# Patient Record
Sex: FEMALE | Race: WHITE
[De-identification: names, ages, dates, MRNs, and addresses within clinical notes are randomized per-mention and may not be internally consistent; named-entity substitution may affect disease eponyms.]

---

## 2018-10-13 ENCOUNTER — HOSPITAL ENCOUNTER (OUTPATIENT)
Dept: HOSPITAL 53 - M WUC | Age: 43
End: 2018-10-13
Attending: NURSE PRACTITIONER
Payer: COMMERCIAL

## 2018-10-13 DIAGNOSIS — E89.0: ICD-10-CM

## 2018-10-13 DIAGNOSIS — E78.49: ICD-10-CM

## 2018-10-13 DIAGNOSIS — E55.9: Primary | ICD-10-CM

## 2018-10-13 DIAGNOSIS — G43.909: ICD-10-CM

## 2018-10-13 LAB
ALBUMIN/GLOBULIN RATIO: 1.18 (ref 1–1.93)
ALBUMIN: 3.9 GM/DL (ref 3.2–5.2)
ALKALINE PHOSPHATASE: 69 U/L (ref 45–117)
ALT SERPL W P-5'-P-CCNC: 21 U/L (ref 12–78)
ANION GAP: 8 MEQ/L (ref 8–16)
AST SERPL-CCNC: 10 U/L (ref 7–37)
BASO #: 0.1 10^3/UL (ref 0–0.2)
BASO %: 0.5 % (ref 0–1)
BILIRUBIN,TOTAL: 0.3 MG/DL (ref 0.2–1)
BLOOD UREA NITROGEN: 11 MG/DL (ref 7–18)
CALCIUM LEVEL: 8.6 MG/DL (ref 8.5–10.1)
CARBON DIOXIDE LEVEL: 30 MEQ/L (ref 21–32)
CHLORIDE LEVEL: 106 MEQ/L (ref 98–107)
CHOLEST SERPL-MCNC: 211 MG/DL (ref ?–200)
CHOLESTEROL RISK RATIO: 4.31 (ref ?–5)
CREATININE FOR GFR: 0.93 MG/DL (ref 0.55–1.3)
EOS #: 0.2 10^3/UL (ref 0–0.5)
EOSINOPHIL NFR BLD AUTO: 2.3 % (ref 0–3)
FREE T3: 2.8 PG/ML (ref 2.2–4)
FREE T4: 1.21 NG/DL (ref 0.76–1.46)
GFR SERPL CREATININE-BSD FRML MDRD: > 60 ML/MIN/{1.73_M2} (ref 58–?)
GLUCOSE, FASTING: 91 MG/DL (ref 70–100)
HDLC SERPL-MCNC: 49 MG/DL (ref 40–?)
HEMATOCRIT: 44.9 % (ref 36–47)
HEMOGLOBIN: 14.8 G/DL (ref 12–15.5)
IMMATURE GRANULOCYTE %: 0.3 % (ref 0–3)
LDL CHOLESTEROL: 129 MG/DL (ref ?–100)
LYMPH #: 2.1 10^3/UL (ref 1.5–4.5)
LYMPH %: 23.2 % (ref 24–44)
MEAN CORPUSCULAR HEMOGLOBIN: 28.8 PG (ref 27–33)
MEAN CORPUSCULAR HGB CONC: 33 G/DL (ref 32–36.5)
MEAN CORPUSCULAR VOLUME: 87.4 FL (ref 80–96)
MONO #: 0.6 10^3/UL (ref 0–0.8)
MONO %: 6.8 % (ref 0–5)
NEUTROPHILS #: 6.1 10^3/UL (ref 1.8–7.7)
NEUTROPHILS %: 66.9 % (ref 36–66)
NONHDLC SERPL-MCNC: 162 MG/DL
NRBC BLD AUTO-RTO: 0 % (ref 0–0)
PLATELET COUNT, AUTOMATED: 332 10^3/UL (ref 150–450)
POTASSIUM SERUM: 3.7 MEQ/L (ref 3.5–5.1)
RED BLOOD COUNT: 5.14 10^6/UL (ref 4–5.4)
RED CELL DISTRIBUTION WIDTH: 12.2 % (ref 11.5–14.5)
SODIUM LEVEL: 144 MEQ/L (ref 136–145)
THYROID STIMULATING HORMONE: 0.24 UIU/ML (ref 0.36–3.74)
TOTAL PROTEIN: 7.2 GM/DL (ref 6.4–8.2)
TRIGLYCERIDES LEVEL: 164 MG/DL (ref ?–150)
WHITE BLOOD COUNT: 9.2 10^3/UL (ref 4–10)

## 2018-10-13 PROCEDURE — 84443 ASSAY THYROID STIM HORMONE: CPT

## 2018-10-15 LAB — 25(OH)D3 SERPL-MCNC: 25.9 NG/ML (ref 30–100)

## 2018-11-03 ENCOUNTER — HOSPITAL ENCOUNTER (OUTPATIENT)
Dept: HOSPITAL 53 - M WUC | Age: 43
End: 2018-11-03
Attending: NURSE PRACTITIONER
Payer: COMMERCIAL

## 2018-11-03 DIAGNOSIS — E89.0: Primary | ICD-10-CM

## 2018-11-03 LAB
FREE T3: 2.8 PG/ML (ref 2.2–4)
FREE T4: 1.33 NG/DL (ref 0.76–1.46)
THYROID STIMULATING HORMONE: 0.62 UIU/ML (ref 0.36–3.74)

## 2018-11-03 PROCEDURE — 84443 ASSAY THYROID STIM HORMONE: CPT

## 2018-12-14 ENCOUNTER — HOSPITAL ENCOUNTER (OUTPATIENT)
Dept: HOSPITAL 53 - M SFHCWAGY | Age: 43
End: 2018-12-14
Attending: NURSE PRACTITIONER
Payer: COMMERCIAL

## 2018-12-14 DIAGNOSIS — Z12.4: Primary | ICD-10-CM

## 2018-12-17 LAB — HPV LOW VOL RFLX: (no result)

## 2018-12-20 ENCOUNTER — HOSPITAL ENCOUNTER (OUTPATIENT)
Dept: HOSPITAL 53 - M WHC | Age: 43
End: 2018-12-20
Attending: NURSE PRACTITIONER
Payer: COMMERCIAL

## 2018-12-20 DIAGNOSIS — N92.1: ICD-10-CM

## 2018-12-20 DIAGNOSIS — N92.6: Primary | ICD-10-CM

## 2018-12-21 NOTE — REP
Clinical:  Pelvic pain and irregular menstrual cycles .

 

Technique:  Transabdominal pelvic ultrasound followed by transvaginal examination

for better evaluation of the endometrium and adnexa.

 

Findings:

 

Bladder is unremarkable and measures 10.7 x 8.2 x 9.8 cm .

 

Anteverted  uterus measures 8.7 x 3.7 x 4.7 cm and a vague heterogeneous 15 x 18

x 23 mm area in the lower uterine segment is nonspecific but fibroid cannot be

excluded.  The endometrial complex measures 3.7 mm thickness.  Trace endocervical

fluid is identified and nonspecific.  A 6 mm endometrial cyst is identified in

the lower uterine segment along with small Nabothian cysts.

 

Bilateral ovaries are essentially normal in appearance.  Right ovary measures 2.3

x 1.3 x 1.7 cm with 9 mm follicle.  Left ovary measures 2.7 x 1.3 x 1.9 cm.

 

No pelvic free fluid or adnexal mass lesion .

 

Impression:

1.  Cannot exclude poorly defined fibroid in the lower uterine segment.

 

 

Electronically Signed by

Ambrose Maciel MD 12/21/2018 08:17 A

## 2019-01-14 ENCOUNTER — HOSPITAL ENCOUNTER (EMERGENCY)
Dept: HOSPITAL 53 - M ED | Age: 44
Discharge: HOME | End: 2019-01-14
Payer: COMMERCIAL

## 2019-01-14 VITALS — BODY MASS INDEX: 28.99 KG/M2 | HEIGHT: 66 IN | WEIGHT: 180.38 LBS

## 2019-01-14 VITALS — DIASTOLIC BLOOD PRESSURE: 87 MMHG | SYSTOLIC BLOOD PRESSURE: 140 MMHG

## 2019-01-14 DIAGNOSIS — E86.0: ICD-10-CM

## 2019-01-14 DIAGNOSIS — R11.0: Primary | ICD-10-CM

## 2019-01-14 DIAGNOSIS — E87.6: ICD-10-CM

## 2019-01-14 DIAGNOSIS — R51: ICD-10-CM

## 2019-01-14 LAB
ALBUMIN SERPL BCG-MCNC: 3.8 GM/DL (ref 3.2–5.2)
ALT SERPL W P-5'-P-CCNC: 19 U/L (ref 12–78)
AMYLASE SERPL-CCNC: 51 U/L (ref 25–115)
B-HCG SERPL QL: NEGATIVE
BASOPHILS # BLD AUTO: 0 10^3/UL (ref 0–0.2)
BASOPHILS NFR BLD AUTO: 0.3 % (ref 0–1)
BILIRUB CONJ SERPL-MCNC: 0.1 MG/DL (ref 0–0.2)
BILIRUB SERPL-MCNC: 0.5 MG/DL (ref 0.2–1)
BUN SERPL-MCNC: 11 MG/DL (ref 7–18)
CALCIUM SERPL-MCNC: 8.9 MG/DL (ref 8.5–10.1)
CHLORIDE SERPL-SCNC: 104 MEQ/L (ref 98–107)
CO2 SERPL-SCNC: 26 MEQ/L (ref 21–32)
CREAT SERPL-MCNC: 0.8 MG/DL (ref 0.55–1.3)
EOSINOPHIL # BLD AUTO: 0.1 10^3/UL (ref 0–0.5)
EOSINOPHIL NFR BLD AUTO: 1 % (ref 0–3)
GFR SERPL CREATININE-BSD FRML MDRD: > 60 ML/MIN/{1.73_M2} (ref 58–?)
GLUCOSE SERPL-MCNC: 93 MG/DL (ref 70–100)
HCT VFR BLD AUTO: 46.2 % (ref 36–47)
HGB BLD-MCNC: 15.5 G/DL (ref 12–15.5)
LIPASE SERPL-CCNC: 98 U/L (ref 73–393)
LYMPHOCYTES # BLD AUTO: 2.6 10^3/UL (ref 1.5–4.5)
LYMPHOCYTES NFR BLD AUTO: 25.1 % (ref 24–44)
MCH RBC QN AUTO: 29.1 PG (ref 27–33)
MCHC RBC AUTO-ENTMCNC: 33.5 G/DL (ref 32–36.5)
MCV RBC AUTO: 86.7 FL (ref 80–96)
MONOCYTES # BLD AUTO: 0.7 10^3/UL (ref 0–0.8)
MONOCYTES NFR BLD AUTO: 6.5 % (ref 0–5)
NEUTROPHILS # BLD AUTO: 7 10^3/UL (ref 1.8–7.7)
NEUTROPHILS NFR BLD AUTO: 66.8 % (ref 36–66)
PLATELET # BLD AUTO: 357 10^3/UL (ref 150–450)
POTASSIUM SERPL-SCNC: 3.3 MEQ/L (ref 3.5–5.1)
PROT SERPL-MCNC: 7.4 GM/DL (ref 6.4–8.2)
RBC # BLD AUTO: 5.33 10^6/UL (ref 4–5.4)
SODIUM SERPL-SCNC: 142 MEQ/L (ref 136–145)
WBC # BLD AUTO: 10.5 10^3/UL (ref 4–10)

## 2019-01-14 PROCEDURE — 81001 URINALYSIS AUTO W/SCOPE: CPT

## 2019-01-14 PROCEDURE — 85025 COMPLETE CBC W/AUTO DIFF WBC: CPT

## 2019-01-14 PROCEDURE — 80048 BASIC METABOLIC PNL TOTAL CA: CPT

## 2019-01-14 PROCEDURE — 36415 COLL VENOUS BLD VENIPUNCTURE: CPT

## 2019-01-14 PROCEDURE — 80076 HEPATIC FUNCTION PANEL: CPT

## 2019-01-14 PROCEDURE — 83690 ASSAY OF LIPASE: CPT

## 2019-01-14 PROCEDURE — 84703 CHORIONIC GONADOTROPIN ASSAY: CPT

## 2019-01-14 PROCEDURE — 96374 THER/PROPH/DIAG INJ IV PUSH: CPT

## 2019-01-14 PROCEDURE — 99284 EMERGENCY DEPT VISIT MOD MDM: CPT

## 2019-01-14 PROCEDURE — 87086 URINE CULTURE/COLONY COUNT: CPT

## 2019-01-14 PROCEDURE — 93005 ELECTROCARDIOGRAM TRACING: CPT

## 2019-01-14 PROCEDURE — 82150 ASSAY OF AMYLASE: CPT

## 2019-01-15 NOTE — ECGEPIP
Stationary ECG Study

                           [facility] - ED

                                       

                                       Test Date:    2019

Pat Name:     DIANE DUENAS             Department:   

Patient ID:   [id number]                 Room:         -

Gender:       F                        Technician:   marysol

:          1975               Requested By: Shawnee HALL PA-C

Order Number: [id number]     Reading MD:   Maja Lundborg-Gray

                                 Measurements

Intervals                              Axis          

Rate:         67                       P:            43

AR:           152                      QRS:          52

QRSD:         95                       T:            80

QT:           393                                    

QTc:          417                                    

                           Interpretive Statements

SINUS RHYTHM

INCOMPLETE RIGHT BUNDLE BRANCH BLOCK

NONSPECIFIC ST & T-WAVE ABNORMALITY

DECREASED QRS VOLTAGE LIMB LEADS

DELAYED R WAVE PROGRESSION

 

CW 17 RATE DECREASED

NONSPECIFIC ST T WAVE CHANGES

 

Electronically Signed On 1- 19:28:40 EST by Maja Lundborg-Gray

## 2019-02-14 ENCOUNTER — HOSPITAL ENCOUNTER (OUTPATIENT)
Dept: HOSPITAL 53 - M SDC | Age: 44
LOS: 1 days | Discharge: HOME | End: 2019-02-15
Attending: UROLOGY
Payer: COMMERCIAL

## 2019-02-14 VITALS — DIASTOLIC BLOOD PRESSURE: 83 MMHG | SYSTOLIC BLOOD PRESSURE: 143 MMHG

## 2019-02-14 VITALS — DIASTOLIC BLOOD PRESSURE: 82 MMHG | SYSTOLIC BLOOD PRESSURE: 162 MMHG

## 2019-02-14 VITALS — DIASTOLIC BLOOD PRESSURE: 85 MMHG | SYSTOLIC BLOOD PRESSURE: 142 MMHG

## 2019-02-14 VITALS — SYSTOLIC BLOOD PRESSURE: 147 MMHG | DIASTOLIC BLOOD PRESSURE: 95 MMHG

## 2019-02-14 VITALS — HEIGHT: 65 IN | BODY MASS INDEX: 30.66 KG/M2 | WEIGHT: 184 LBS

## 2019-02-14 VITALS — DIASTOLIC BLOOD PRESSURE: 84 MMHG | SYSTOLIC BLOOD PRESSURE: 139 MMHG

## 2019-02-14 DIAGNOSIS — N93.9: Primary | ICD-10-CM

## 2019-02-14 DIAGNOSIS — N72: ICD-10-CM

## 2019-02-14 DIAGNOSIS — Z88.2: ICD-10-CM

## 2019-02-14 DIAGNOSIS — Z91.030: ICD-10-CM

## 2019-02-14 DIAGNOSIS — K21.9: ICD-10-CM

## 2019-02-14 DIAGNOSIS — Z92.3: ICD-10-CM

## 2019-02-14 DIAGNOSIS — Z79.899: ICD-10-CM

## 2019-02-14 DIAGNOSIS — G47.30: ICD-10-CM

## 2019-02-14 DIAGNOSIS — Z85.850: ICD-10-CM

## 2019-02-14 DIAGNOSIS — Z79.84: ICD-10-CM

## 2019-02-14 DIAGNOSIS — J45.909: ICD-10-CM

## 2019-02-14 LAB
CREAT SERPL-MCNC: 0.7 MG/DL (ref 0.55–1.3)
GFR SERPL CREATININE-BSD FRML MDRD: > 60 ML/MIN/{1.73_M2} (ref 58–?)
HCT VFR BLD AUTO: 39.7 % (ref 36–47)
HCT VFR BLD AUTO: 44.6 % (ref 36–47)
HGB BLD-MCNC: 13.2 G/DL (ref 12–15.5)
HGB BLD-MCNC: 15.4 G/DL (ref 12–15.5)
MCH RBC QN AUTO: 29.6 PG (ref 27–33)
MCHC RBC AUTO-ENTMCNC: 34.5 G/DL (ref 32–36.5)
MCV RBC AUTO: 85.6 FL (ref 80–96)
PLATELET # BLD AUTO: 346 10^3/UL (ref 150–450)
RBC # BLD AUTO: 5.21 10^6/UL (ref 4–5.4)
WBC # BLD AUTO: 9.9 10^3/UL (ref 4–10)

## 2019-02-14 PROCEDURE — 85025 COMPLETE CBC W/AUTO DIFF WBC: CPT

## 2019-02-14 PROCEDURE — 80051 ELECTROLYTE PANEL: CPT

## 2019-02-14 PROCEDURE — 94640 AIRWAY INHALATION TREATMENT: CPT

## 2019-02-14 PROCEDURE — 85027 COMPLETE CBC AUTOMATED: CPT

## 2019-02-14 PROCEDURE — 58571 TLH W/T/O 250 G OR LESS: CPT

## 2019-02-14 PROCEDURE — 86901 BLOOD TYPING SEROLOGIC RH(D): CPT

## 2019-02-14 PROCEDURE — 88307 TISSUE EXAM BY PATHOLOGIST: CPT

## 2019-02-14 PROCEDURE — 86850 RBC ANTIBODY SCREEN: CPT

## 2019-02-14 PROCEDURE — 85014 HEMATOCRIT: CPT

## 2019-02-14 PROCEDURE — 82565 ASSAY OF CREATININE: CPT

## 2019-02-14 PROCEDURE — 36415 COLL VENOUS BLD VENIPUNCTURE: CPT

## 2019-02-14 PROCEDURE — 82947 ASSAY GLUCOSE BLOOD QUANT: CPT

## 2019-02-14 PROCEDURE — 85018 HEMOGLOBIN: CPT

## 2019-02-14 PROCEDURE — 94760 N-INVAS EAR/PLS OXIMETRY 1: CPT

## 2019-02-14 PROCEDURE — 76775 US EXAM ABDO BACK WALL LIM: CPT

## 2019-02-14 PROCEDURE — 86900 BLOOD TYPING SEROLOGIC ABO: CPT

## 2019-02-14 RX ADMIN — FENTANYL CITRATE PRN MCG: 50 INJECTION, SOLUTION INTRAMUSCULAR; INTRAVENOUS at 13:10

## 2019-02-14 RX ADMIN — BUDESONIDE AND FORMOTEROL FUMARATE DIHYDRATE SCH PUFF: 80; 4.5 AEROSOL RESPIRATORY (INHALATION) at 21:00

## 2019-02-14 RX ADMIN — FENTANYL CITRATE PRN MCG: 50 INJECTION, SOLUTION INTRAMUSCULAR; INTRAVENOUS at 13:05

## 2019-02-14 RX ADMIN — MULTIPLE VITAMINS W/ MINERALS TAB SCH TAB: TAB at 21:17

## 2019-02-14 RX ADMIN — HYDROMORPHONE HYDROCHLORIDE PRN MG: 1 INJECTION, SOLUTION INTRAMUSCULAR; INTRAVENOUS; SUBCUTANEOUS at 13:20

## 2019-02-14 RX ADMIN — FENTANYL CITRATE PRN MCG: 50 INJECTION, SOLUTION INTRAMUSCULAR; INTRAVENOUS at 13:00

## 2019-02-14 RX ADMIN — FLUTICASONE PROPIONATE SCH SPRAY: 50 SPRAY, METERED NASAL at 21:17

## 2019-02-14 RX ADMIN — HYDROMORPHONE HYDROCHLORIDE PRN MG: 1 INJECTION, SOLUTION INTRAMUSCULAR; INTRAVENOUS; SUBCUTANEOUS at 12:50

## 2019-02-14 RX ADMIN — TORSEMIDE SCH MG: 20 TABLET ORAL at 21:16

## 2019-02-14 RX ADMIN — OMEPRAZOLE SCH MG: 20 CAPSULE, DELAYED RELEASE ORAL at 21:29

## 2019-02-14 RX ADMIN — HYDROMORPHONE HYDROCHLORIDE PRN MG: 1 INJECTION, SOLUTION INTRAMUSCULAR; INTRAVENOUS; SUBCUTANEOUS at 14:30

## 2019-02-14 RX ADMIN — HYDROMORPHONE HYDROCHLORIDE PRN MG: 1 INJECTION, SOLUTION INTRAMUSCULAR; INTRAVENOUS; SUBCUTANEOUS at 14:25

## 2019-02-14 RX ADMIN — HYDROMORPHONE HYDROCHLORIDE PRN MG: 1 INJECTION, SOLUTION INTRAMUSCULAR; INTRAVENOUS; SUBCUTANEOUS at 13:25

## 2019-02-14 RX ADMIN — SODIUM CHLORIDE, POTASSIUM CHLORIDE, SODIUM LACTATE AND CALCIUM CHLORIDE SCH MLS/HR: 600; 310; 30; 20 INJECTION, SOLUTION INTRAVENOUS at 16:15

## 2019-02-14 RX ADMIN — SODIUM CHLORIDE, POTASSIUM CHLORIDE, SODIUM LACTATE AND CALCIUM CHLORIDE SCH MLS/HR: 600; 310; 30; 20 INJECTION, SOLUTION INTRAVENOUS at 18:13

## 2019-02-14 RX ADMIN — FENTANYL CITRATE PRN MCG: 50 INJECTION, SOLUTION INTRAMUSCULAR; INTRAVENOUS at 12:55

## 2019-02-14 RX ADMIN — SODIUM CHLORIDE, POTASSIUM CHLORIDE, SODIUM LACTATE AND CALCIUM CHLORIDE SCH MLS/HR: 600; 310; 30; 20 INJECTION, SOLUTION INTRAVENOUS at 21:29

## 2019-02-15 VITALS — SYSTOLIC BLOOD PRESSURE: 145 MMHG | DIASTOLIC BLOOD PRESSURE: 80 MMHG

## 2019-02-15 VITALS — DIASTOLIC BLOOD PRESSURE: 92 MMHG | SYSTOLIC BLOOD PRESSURE: 155 MMHG

## 2019-02-15 VITALS — DIASTOLIC BLOOD PRESSURE: 88 MMHG | SYSTOLIC BLOOD PRESSURE: 152 MMHG

## 2019-02-15 VITALS — SYSTOLIC BLOOD PRESSURE: 141 MMHG | DIASTOLIC BLOOD PRESSURE: 74 MMHG

## 2019-02-15 LAB
BASOPHILS # BLD AUTO: 0 10^3/UL (ref 0–0.2)
BASOPHILS NFR BLD AUTO: 0.1 % (ref 0–1)
CHLORIDE SERPL-SCNC: 105 MEQ/L (ref 98–107)
CO2 SERPL-SCNC: 28 MEQ/L (ref 21–32)
EOSINOPHIL # BLD AUTO: 0 10^3/UL (ref 0–0.5)
EOSINOPHIL NFR BLD AUTO: 0.2 % (ref 0–3)
GLUCOSE SERPL-MCNC: 109 MG/DL (ref ?–200)
HCT VFR BLD AUTO: 38.7 % (ref 36–47)
HGB BLD-MCNC: 12.9 G/DL (ref 12–15.5)
LYMPHOCYTES # BLD AUTO: 2.1 10^3/UL (ref 1.5–4.5)
LYMPHOCYTES NFR BLD AUTO: 12.5 % (ref 24–44)
MCH RBC QN AUTO: 29.3 PG (ref 27–33)
MCHC RBC AUTO-ENTMCNC: 33.3 G/DL (ref 32–36.5)
MCV RBC AUTO: 87.8 FL (ref 80–96)
MONOCYTES # BLD AUTO: 0.8 10^3/UL (ref 0–0.8)
MONOCYTES NFR BLD AUTO: 5 % (ref 0–5)
NEUTROPHILS # BLD AUTO: 13.7 10^3/UL (ref 1.8–7.7)
NEUTROPHILS NFR BLD AUTO: 81.7 % (ref 36–66)
PLATELET # BLD AUTO: 269 10^3/UL (ref 150–450)
POTASSIUM SERPL-SCNC: 3.7 MEQ/L (ref 3.5–5.1)
RBC # BLD AUTO: 4.41 10^6/UL (ref 4–5.4)
SODIUM SERPL-SCNC: 140 MEQ/L (ref 136–145)
WBC # BLD AUTO: 16.7 10^3/UL (ref 4–10)
WBC # BLD AUTO: 19.1 10^3/UL (ref 4–10)

## 2019-02-15 RX ADMIN — MULTIPLE VITAMINS W/ MINERALS TAB SCH TAB: TAB at 09:00

## 2019-02-15 RX ADMIN — IBUPROFEN PRN MG: 600 TABLET, FILM COATED ORAL at 01:53

## 2019-02-15 RX ADMIN — OMEPRAZOLE SCH MG: 20 CAPSULE, DELAYED RELEASE ORAL at 10:00

## 2019-02-15 RX ADMIN — FLUTICASONE PROPIONATE SCH SPRAY: 50 SPRAY, METERED NASAL at 10:01

## 2019-02-15 RX ADMIN — BUDESONIDE AND FORMOTEROL FUMARATE DIHYDRATE SCH PUFF: 80; 4.5 AEROSOL RESPIRATORY (INHALATION) at 08:31

## 2019-02-15 RX ADMIN — IBUPROFEN PRN MG: 600 TABLET, FILM COATED ORAL at 16:32

## 2019-02-15 RX ADMIN — TORSEMIDE SCH MG: 20 TABLET ORAL at 10:00

## 2019-02-15 RX ADMIN — IBUPROFEN PRN MG: 600 TABLET, FILM COATED ORAL at 09:59

## 2019-02-15 RX ADMIN — SODIUM CHLORIDE, POTASSIUM CHLORIDE, SODIUM LACTATE AND CALCIUM CHLORIDE SCH MLS/HR: 600; 310; 30; 20 INJECTION, SOLUTION INTRAVENOUS at 06:00

## 2019-02-15 NOTE — REP
Renal ultrasound for low urine output:

Comparison is 08/20/2016.

The right kidney measures 10.2 x 5.0 x 4.6 cm.

The left kidney measures 10.1 x 5.0 x 4.9 cm.

The kidneys are normal size.

Renal cortical echogenicity is normal bilaterally.

There is no hydronephrosis.

There is questionably a nonobstructive right renal upper pole 7.5 mm calculus.

There are no other renal calculi.

There are no solid or cystic renal masses.

Impression:

Questionable nonobstructive right renal upper pole calculus.

Otherwise, negative renal ultrasound.

 

Bladder ultrasound:

There is a Orozco catheter in the bladder.  The bladder is collapsed and cannot be

evaluated at this time.

 

 

Electronically Signed by

Dl Holland MD 02/15/2019 09:01 A

## 2019-02-16 NOTE — RO
DATE OF SURGERY:  02/14/2019

 

PREOPERATIVE DIAGNOSES AND INDICATION FOR SURGERY:  Pain, bleeding, failed

conservative measures.

 

POSTOPERATIVE DIAGNOSES:  Pain, bleeding, failed conservative measures.

 

PROCEDURE:  Laparoscopic-assisted vaginal hysterectomy with bilateral

salpingo-oophorectomy.

 

SURGEON:  Starla Mcclure MD

 

ASSISTANT:  None.

 

ANESTHESIA:  General endotracheal anesthesia.

 

BRIEF DESCRIPTION OF PROCEDURE AND FINDINGS:  Leila was brought to the operating

room where sufficient general endotracheal anesthesia was induced.  She was

prepped, draped, and positioned in the usual sterile fashion, the weighted

speculum placed, the bladder emptied, and a Orozco with the ability to backfill

placed.  It felt like this had some difficulty getting the manipulator in, as she

has had a previous ablation; and so, it took a little bit of pressure to get past

4 cm, and then she ended up sounding to 12; so, I was a bit suspicious that we

had perforated.  We did place the manipulator without really any difficulty and

then turned the attention to the laparoscopic portion of the procedure.

 

A transverse semilunar incision was made below the umbilicus.  Sharp and blunt

dissection were continued to the subcutaneous tissues to the level of the rectus

fascia, which was transversely incised, secured with 0 Vicryl retention sutures,

and then the peritoneal cavity entered under direct visualization in an open

laparoscopic technique.  The Jillian cannula was placed and secured in place with

0 Vicryl retention sutures, and CO2 insufflation was then begun.

 

After adequate CO2 insufflation, the peritoneal cavity was visualized.  There

were normal shiny peritoneal surfaces throughout.  There was no excrescence,

ascites, nor exudate.  There was a perforation of the uterus, as suspected,

without significant bleeding or concern in this uterus, which is after all coming

out.  There is evidence of the patient's previous tubal ligation but no

significant adhesions.  There is a normal appendix and normal abdominal survey.

Pictures were taken to document these normal findings.  There was some minor

adhesions of the bladder over the anterior uterus but nothing obstructive to the

case.  We then placed the patient in Trendelenburg and used the Enseal

articulating dissector to cauterize and transect the infundibulopelvic ligaments

bilaterally and then worked our way gradually through the mesentery of the adnexa

through the round ligaments bilaterally, and we backfilled the bladder to make

sure that we were not too close to the bladder.  We decided against incising the

bladder from above and rather went below.  Since we had a reasonably mobile

uterus to deal with, we went below and began to work vaginally.

 

Working vaginally, the uterine manipulator was removed.  Single-tooth tenaculum

placed in the anterior and posterior aspects of the cervix, and a scalpel was

used to incise circumferentially around the base of the cervix.  The cardinal

ligaments then isolated, clamped with de Jara clamps, which were used

throughout this portion of the case, and transected, and then ligated using 0

Vicryl suture which was also used throughout this portion of the case.  The

uterosacral ligaments were then clamped, transected, and ligated, and the

posterior flexion of the peritoneum entered, of course, with the Jorge weighted

retractor placed.  I then continued the dissection anteriorly to displace the

bladder and then carefully worked our way through the uterine vessels laterally,

working one side, then the other with the de Jara clamp transection and 0

Vicryl suture ligation.  With continued gradual dissection along the uterus, the

uterus was subsequently removed with the attached ovaries and tubes.  The angle

stitches of 0 Vicryl were placed; and working vaginally, we were able to see that

we did not have any significant intraabdominal bleeding nor was there any

evidence of injury to ureters, bladder, or bowel.  The vaginal cuff was then

closed using 0 Vicryl suture in the normal fashion with good approximation and

hemostasis achieved.  We then also closed the laparoscopic sites at the umbilicus

with the port removed.  Of course, the CO2 was already out of the abdomen.  The

fascial layer was closed with 0 Vicryl retention sutures, and the skin was closed

with 3-0 Vicryl in a subcuticular stitch, and the bladder was switched over from

the backfill to a regular Orozco, and a dry sterile dressing, of course, had been

applied.  Then, the procedure ended.

 

Estimated blood loss for the procedure about 100 mL.

 

Fluid replacement was crystalloid.

 

COMPLICATIONS:  None.

 

CONDITION AND DISPOSITION:  Leila tolerated the procedure well and was recovering

in the recovery room in good condition.

## 2019-07-13 ENCOUNTER — HOSPITAL ENCOUNTER (OUTPATIENT)
Dept: HOSPITAL 53 - M WUC | Age: 44
End: 2019-07-13
Attending: PHYSICIAN ASSISTANT
Payer: COMMERCIAL

## 2019-07-13 DIAGNOSIS — Z85.850: ICD-10-CM

## 2019-07-13 DIAGNOSIS — E89.0: ICD-10-CM

## 2019-07-13 DIAGNOSIS — E66.3: ICD-10-CM

## 2019-07-13 DIAGNOSIS — Z13.220: ICD-10-CM

## 2019-07-13 DIAGNOSIS — Z13.1: ICD-10-CM

## 2019-07-13 DIAGNOSIS — F41.8: ICD-10-CM

## 2019-07-13 DIAGNOSIS — Z83.3: ICD-10-CM

## 2019-07-13 DIAGNOSIS — Z76.89: Primary | ICD-10-CM

## 2019-07-13 LAB
ALBUMIN SERPL BCG-MCNC: 3.8 GM/DL (ref 3.2–5.2)
ALT SERPL W P-5'-P-CCNC: 18 U/L (ref 12–78)
BILIRUB SERPL-MCNC: 0.5 MG/DL (ref 0.2–1)
BUN SERPL-MCNC: 14 MG/DL (ref 7–18)
CALCIUM SERPL-MCNC: 8.9 MG/DL (ref 8.5–10.1)
CHLORIDE SERPL-SCNC: 104 MEQ/L (ref 98–107)
CHOLEST SERPL-MCNC: 241 MG/DL (ref ?–200)
CHOLEST/HDLC SERPL: 3.65 {RATIO} (ref ?–5)
CO2 SERPL-SCNC: 32 MEQ/L (ref 21–32)
CREAT SERPL-MCNC: 0.95 MG/DL (ref 0.55–1.3)
GFR SERPL CREATININE-BSD FRML MDRD: > 60 ML/MIN/{1.73_M2} (ref 58–?)
GLUCOSE SERPL-MCNC: 78 MG/DL (ref 70–100)
HCT VFR BLD AUTO: 44.1 % (ref 36–47)
HDLC SERPL-MCNC: 66 MG/DL (ref 40–?)
HGB BLD-MCNC: 14.5 G/DL (ref 12–15.5)
LDLC SERPL CALC-MCNC: 138 MG/DL (ref ?–100)
MCH RBC QN AUTO: 28.7 PG (ref 27–33)
MCHC RBC AUTO-ENTMCNC: 32.9 G/DL (ref 32–36.5)
MCV RBC AUTO: 87.2 FL (ref 80–96)
NONHDLC SERPL-MCNC: 175 MG/DL
PLATELET # BLD AUTO: 287 10^3/UL (ref 150–450)
POTASSIUM SERPL-SCNC: 4.1 MEQ/L (ref 3.5–5.1)
PROT SERPL-MCNC: 7.3 GM/DL (ref 6.4–8.2)
RBC # BLD AUTO: 5.06 10^6/UL (ref 4–5.4)
SODIUM SERPL-SCNC: 140 MEQ/L (ref 136–145)
T3FREE SERPL-MCNC: 2.4 PG/ML (ref 2.2–4)
T4 FREE SERPL-MCNC: 1.21 NG/DL (ref 0.76–1.46)
TRIGL SERPL-MCNC: 184 MG/DL (ref ?–150)
TSH SERPL DL<=0.005 MIU/L-ACNC: 1.81 UIU/ML (ref 0.36–3.74)
WBC # BLD AUTO: 7.1 10^3/UL (ref 4–10)